# Patient Record
(demographics unavailable — no encounter records)

---

## 2024-11-27 NOTE — PLAN
[TextEntry] : - No further diet restriction - Continued weight lifting restriction (15 lbs) until 6 weeks post op. - RTC in 1 month for re-evaluation.

## 2024-11-27 NOTE — HISTORY OF PRESENT ILLNESS
[FreeTextEntry1] : 72M recently admitted to [facility] with a benign large bowel obstruction due to diverticular disease. Underwent an urgent open Romeo's procedure on 6/5.  He tolerated the procedure well and most recently underwent an elective minimally invasive colostomy reversal on 11/8/24. He tolerated the procedure well and was discharged on POD6. Today he presents for interval follow up and reports overall doing well. Does continue to have some discomfort at the prior colostomy site but otherwise pain is controlled, denies nausea, and have regular nonbloody daily bowel movements. His appetite and energy have not fully returned but are slowly improving.   PMHx: Asthma  PSHx: N/A Meds: Albuterol PRN  Last C-scope: 3 yrs ago, unremarkable

## 2024-11-27 NOTE — PHYSICAL EXAM
[JVD] : no jugular venous distention  [Normal Breath Sounds] : Normal breath sounds [No Rash or Lesion] : No rash or lesion [Alert] : alert [Calm] : calm [de-identified] : Soft, nontender, nondistended. Well healing lap and extraction incisions. No appreciable ventral hernia with valsalva.  [de-identified] : Well appearing male in NAD [de-identified] : ROM WNL [de-identified] : MMM

## 2024-11-27 NOTE — ASSESSMENT
[FreeTextEntry1] : 73M with complicated diverticulitis s/p Romeo's procedure now s/p minimally invasive colostomy closure, recovering well.

## 2024-12-18 NOTE — PHYSICAL EXAM
[JVD] : no jugular venous distention  [Normal Breath Sounds] : Normal breath sounds [No Rash or Lesion] : No rash or lesion [Alert] : alert [Calm] : calm [de-identified] : Soft, nontender, nondistended. Well healed lap and extraction incisions. No appreciable ventral hernia with valsalva.  [de-identified] : Well appearing male in NAD [de-identified] : MMM [de-identified] : ROM WNL

## 2024-12-18 NOTE — PHYSICAL EXAM
[JVD] : no jugular venous distention  [Normal Breath Sounds] : Normal breath sounds [No Rash or Lesion] : No rash or lesion [Alert] : alert [Calm] : calm [de-identified] : Soft, nontender, nondistended. Well healed lap and extraction incisions. No appreciable ventral hernia with valsalva.  [de-identified] : Well appearing male in NAD [de-identified] : MMM [de-identified] : ROM WNL

## 2025-01-22 NOTE — PLAN
[TextEntry] : - Will need 1 additional month from full activity while he works on his strength training and endurance. - RTC in 1 month for clearance to return to full activity/work.

## 2025-01-22 NOTE — HISTORY OF PRESENT ILLNESS
[FreeTextEntry1] : 72M recently admitted to [facility] with a benign large bowel obstruction due to diverticular disease. Underwent an urgent open Romeo's procedure on 6/5/24. He tolerated the procedure well and underwent an elective minimally invasive colostomy reversal on 11/8/24. Last seen in December for interval follow up, recovering well. Patient presents today to determine if ready to return to work and for activity clearance.  He reports that he is doing well, appetite returned, tolerating a diet, having regular bowel function and denies significant abdominal discomfort. Activity and energy have improved however he has yet to start lifting any considerable weight.   PMHx: Asthma PSHx: N/A Meds: Albuterol PRN Last C-scope: 3 yrs ago, unremarkable

## 2025-01-22 NOTE — PHYSICAL EXAM
[JVD] : no jugular venous distention  [Normal Breath Sounds] : Normal breath sounds [No Rash or Lesion] : No rash or lesion [Alert] : alert [Calm] : calm [de-identified] : Soft, nontender, nondistended. Well healed lap and extraction incisions. No appreciable ventral hernia with valsalva.  [de-identified] : Well appearing male in NAD [de-identified] : MMM [de-identified] : ROM WNL

## 2025-01-22 NOTE — PHYSICAL EXAM
[JVD] : no jugular venous distention  [Normal Breath Sounds] : Normal breath sounds [No Rash or Lesion] : No rash or lesion [Alert] : alert [Calm] : calm [de-identified] : Soft, nontender, nondistended. Well healed lap and extraction incisions. No appreciable ventral hernia with valsalva.  [de-identified] : Well appearing male in NAD [de-identified] : MMM [de-identified] : ROM WNL

## 2025-01-22 NOTE — HISTORY OF PRESENT ILLNESS
[FreeTextEntry1] : 72M recently admitted to [facility] with a benign large bowel obstruction due to diverticular disease. Underwent an urgent open Romeo's procedure on 6/5/24. He tolerated the procedure well and underwent an elective minimally invasive colostomy reversal on 11/8/24. Last seen in December for interval follow up, recovering well. Patient presents today to determine if ready to return to work and for activity clearance.  He reports that he is doing well, appetite returned, tolerating a diet, having regular bowel function and denies significant abdominal discomfort. Activity and energy have improved however he has yet to start lifting any considerable weight.   PMHx: Asthma PSHx: N/A Meds: Albuterol PRN Last C-scope: 3 yrs ago, unremarkable

## 2025-02-26 NOTE — ASSESSMENT
[FreeTextEntry1] : 73M with complicated diverticulitis s/p Romeo's procedure now s/p minimally invasive colostomy closure, recovered well.

## 2025-02-26 NOTE — HISTORY OF PRESENT ILLNESS
[FreeTextEntry1] : 73M admitted to [facility] with a benign large bowel obstruction due to diverticular disease in June 2024. Underwent an urgent open Romeo's procedure on 6/5/24. He tolerated the procedure well and underwent an elective minimally invasive colostomy reversal on 11/8/24. Last seen about a month ago for follow up. Was advised he will need one more month from full activity while he works on his strength training and endurance. Patient returns today for clearance to full activity/work.  Patient states overall doing well. Appetite and energy good. Denies any abdominal pain. Reports constipated not taking stool softeners. Patient has been carrying some load of groceries and washing dishes w/o any abdominal pain. Complaining of intentional tremor which he noticed after surgery.   PMHx: Asthma PSHx:  open Romeo's procedure on 6/5/24, coloscopy 11/8/24  Meds: Albuterol PRN Last C-scope: 3 yrs ago, unremarkable

## 2025-02-26 NOTE — PHYSICAL EXAM
[JVD] : no jugular venous distention  [Normal Breath Sounds] : Normal breath sounds [No Rash or Lesion] : No rash or lesion [Alert] : alert [Calm] : calm [de-identified] : Soft, nontender, nondistended. Well healed lap and extraction scars. No appreciable ventral hernia with valsalva.  [de-identified] : Well appearing male in NAD [de-identified] : MMM [de-identified] : ROM WNL

## 2025-02-26 NOTE — HISTORY OF PRESENT ILLNESS
[FreeTextEntry1] : 73M admitted to [facility] with a benign large bowel obstruction due to diverticular disease in June 2024. Underwent an urgent open Romeo's procedure on 6/5/24. He tolerated the procedure well and underwent an elective minimally invasive colostomy reversal on 11/8/24. Last seen about a month ago for follow up. Was advised he will need one more month from full activity while he works on his strength training and endurance. Patient returns today for clearance to full activity/work.  Patient states overall doing well. Appetite and energy good. Denies any abdominal pain. Reports constipated not taking stool softeners. Patient has been carrying some load of groceries and washing dishes w/o any abdominal pain. Complaining of intentional tremor which he noticed after surgery.   PMHx: Asthma PSHx:  open Ormeo's procedure on 6/5/24, coloscopy 11/8/24  Meds: Albuterol PRN Last C-scope: 3 yrs ago, unremarkable